# Patient Record
(demographics unavailable — no encounter records)

---

## 2024-12-06 NOTE — REVIEW OF SYSTEMS
[Constipation] : constipation [Diarrhea: Grade 0] : Diarrhea: Grade 0 [Negative] : Allergic/Immunologic [FreeTextEntry2] : UTD with mammogram, las colonoscopy 4-5 yrs ago

## 2024-12-06 NOTE — HISTORY OF PRESENT ILLNESS
[Disease: _____________________] : Disease: [unfilled] [T: ___] : T[unfilled] [N: ___] : N[unfilled] [AJCC Stage: ____] : AJCC Stage: [unfilled] [de-identified] : Patient is a 68-year-old woman who presented to her GYN, DrArturo Bland for PMB X 1 month. On 7/14/2023, endometrial biopsy showed endometrial adenocarcinoma, endometroid type, FIGO grade 1. She was referred to Dr. Loo for further evaluation on 8/15/2023.   On 8/27/2023, CT A/P showed endometrial thickening up to 1.7 cm correlates with patient's history of endometrial cancer. No definite evidence of metastatic disease in the abdomen and pelvis.  On 10/23/2023, she underwent laparoscopic total hysterectomy and bilateral salpingo-oophorectomy with b/l sentinel lymph node biopsy. On amended pathology report: she had well differentiated endometrioid carcinoma, Grade 1, 43% of myometrial invasion with extensive LVSI. There were 2/6 positive left sentinel lymph nodes, largest cluster measured 0.86 mm. Five right pelvic nodes were negative. There was no cervical stromal involvement, no uterine serosa involvement, no malignancy seen on ovaries and fallopian tubes. The peritoneal washing was negative for malignant cells. She has recovered well from her procedure well.   A few days ago she started having some vaginal  discharge. She is here to discuss  adjuvant treatment. [de-identified] : endometrioid ca [de-identified] : 12/18/23 Pt is here for follow up accompanied by his son. She reports feeling well. She reports numbness on sole of her feet. She had it even prior to starting chemo but got a little worse after a few days of chemo and it is back to normal.  We discussed dropping the chemo dose but she wants to c/w same dose for now. Son was concerned by a lot of anxiety that pt has and she probably wakes up with panic attack.   1/9/24 Pt is here for follow up. started taking gabapentin with some relief however stopped it after 4 doses. No N, V, D  1/30/24 Pt is here for follow up.  [id number] Radha is on a video call.  Pt states she does have numbness and tingling in the feet and her rt forearm usually during the 3,4,5,6 after chemotherapy. She states gabapentin helps with the pain.  She does complain of constipation which she self-treats with pumpkins and apples. She gets mild discomfort in the rt upper quadrant after treatment but that resolves on its own. Denies any nausea, vomiting, diarrhea. Otherwise, she denies any other complains.  2/20/24 Pt is here for follow up. Feels well. Tolerating chemo well. No pain  3/12/24 Pt is here for follow up. Tolerating chemo well except worsening neuropathy  6/20/24 Pt is here today for follow up visit.   #[id number] Vladslav present. Pt still c/o paresthesias.  Seen by Neuro- increased Gapapentin dose to TID from BID. Chemo and Radiation completed. 9/3/24 Continues to have neuropathy. Is on Gabapentin per neuro No abd pain, N, V, vag bleeding Has pain in the coccyx since RT completed 12/06/24:   # [id number] Owen (Citizen of [place]). Patient presented here today for a follow up post treatment for endometrial adenocarcinoma. Continues to have neuropathy. She is taking Gabapentin only once daily, despite the Neuro recommendation 3x daily.   No abdominal pain, N, V, vaginal bleeding. She is UTD with Colonoscopy from Oct 2024.  Has pain in the coccyx since RT completed

## 2024-12-06 NOTE — REASON FOR VISIT
[Follow-Up Visit] : a follow-up [Family Member] : family member [FreeTextEntry2] : uterine Ca [Interpreters_IDNumber] : [id number] [Interpreters_FullName] : Vlkayode [TWNoteComboBox1] : Paraguayan

## 2024-12-06 NOTE — ASSESSMENT
[FreeTextEntry1] : In summary this is is a 68  year old woman with endometrial carcinoma, FIGO Stage IIIC1, grade 1. She is s/p laparoscopic total hysterectomy and bilateral salpingo-oophorectomy with b/l sentinel lymph node biopsy. p53 wild type, POLE nonmutated, KRISTIN  Pt started treatment with Carboplatin and Taxol. Q 3 weeks switched to Carbo/Taxotere d/t neuropathy Chemo completed 3/12/24. RT. Completed 5/31/24. RECOMMENDATIONS:   S/S recurrence d/w pt. GYN ONC follow up  # Peripheral neuropathy -Encouraged to continue gabapentin 300 mg tid Neuro eval. Physical therapy, 2x/ week, she reports getting better with PT. # HCM mammogram from 10/10/24 with No imaging evidence of malignancy.  S/P GI follow up with screening colonoscopy and Endoscopy done from 10/4/24 results in the charts.  Colon Polyps, Diverticulosis, Internal hemorrhoids,  RTC in 4 months

## 2025-01-16 NOTE — PHYSICAL EXAM
[Sclera] : the sclera and conjunctiva were normal [Hearing Threshold Finger Rub Not [place]] : hearing was normal [] : no respiratory distress [Respiration, Rhythm And Depth] : normal respiratory rhythm and effort [Exaggerated Use Of Accessory Muscles For Inspiration] : no accessory muscle use [Heart Rate And Rhythm] : heart rate and rhythm were normal [Abdomen Soft] : soft [Nondistended] : nondistended [Abdomen Tenderness] : non-tender [No Lesions] : no lesions were seen on the external genitalia [No Bleeding] : there was no active vaginal bleeding [Absent] : was absent [Femoral Lymph Nodes Enlarged Bilaterally] : femoral [Inguinal Lymph Nodes Enlarged Bilaterally] : inguinal [Nail Clubbing] : no clubbing  or cyanosis of the fingernails [No Focal Deficits] : no focal deficits [Motor Exam] : the motor exam was normal [Normal] : no palpable adenopathy [Ulcer ___cm] : no ulcer [Mass ___ cm] : no mass

## 2025-01-16 NOTE — LETTER CLOSING
[Consult Closing:] : Thank you for allowing me to participate in the care of this patient.  If you have any questions, please do not hesitate to contact me. [Sincerely yours,] : Sincerely yours, [FreeTextEntry3] : Amber Quintero M.D.   Electronically proofread and signed by:  Amber Quintero MD Attending, Department of Radiation Medicine [facility]

## 2025-01-16 NOTE — PHYSICAL EXAM
[Sclera] : the sclera and conjunctiva were normal [Hearing Threshold Finger Rub Not [place]] : hearing was normal [] : no respiratory distress [Respiration, Rhythm And Depth] : normal respiratory rhythm and effort [Exaggerated Use Of Accessory Muscles For Inspiration] : no accessory muscle use [Heart Rate And Rhythm] : heart rate and rhythm were normal [Abdomen Soft] : soft [Nondistended] : nondistended [Abdomen Tenderness] : non-tender [No Lesions] : no lesions were seen on the external genitalia [No Bleeding] : there was no active vaginal bleeding [Absent] : was absent [Femoral Lymph Nodes Enlarged Bilaterally] : femoral [Inguinal Lymph Nodes Enlarged Bilaterally] : inguinal [Nail Clubbing] : no clubbing  or cyanosis of the fingernails [No Focal Deficits] : no focal deficits [Motor Exam] : the motor exam was normal [Normal] : no palpable adenopathy [Ulcer ___cm] : no ulcer [Mass ___ cm] : no mass

## 2025-01-16 NOTE — LETTER CLOSING
[Consult Closing:] : Thank you for allowing me to participate in the care of this patient.  If you have any questions, please do not hesitate to contact me. [Sincerely yours,] : Sincerely yours, [FreeTextEntry3] : Amber Quintero M.D.   Electronically proofread and signed by:  Amber Quintero MD Attending, Department of Radiation Medicine [facility]

## 2025-01-16 NOTE — REASON FOR VISIT
[Routine Follow-Up] : routine follow-up visit for [Endometrial Cancer] : endometrial cancer [Other: ______] : provided by OLAYINKA [Interpreters_IDNumber] : [id number] [Interpreters_FullName] : jasson [TWNoteComboBox1] : [place] Islander

## 2025-01-16 NOTE — LETTER CLOSING
[Consult Closing:] : Thank you for allowing me to participate in the care of this patient.  If you have any questions, please do not hesitate to contact me. [Sincerely yours,] : Sincerely yours, [FreeTextEntry3] : Amber Quintero M.D.   Electronically proofread and signed by:  Amber Quintero MD Attending, Department of Radiation Medicine [facility]

## 2025-01-16 NOTE — REVIEW OF SYSTEMS
[Recent Change In Weight] : ~T recent weight change [Dry Eyes] : dryness of the eyes [Joint Pain] : joint pain [Constipation] : constipation [Muscle Pain] : muscle pain [Gait Disturbance] : gait disturbance [Muscle Weakness] : muscle weakness [Negative] : Genitourinary [Abdominal Pain] : no abdominal pain [Vomiting] : no vomiting [Diarrhea] : no diarrhea [Urinary Frequency] : no change in urinary frequency [Nocturia] : no nocturia [Vaginal Discharge] : no vaginal discharge [Dysmenorrhea/Abn Vaginal Bleeding] : no dysmenorrhea/abnormal vaginal bleeding [FreeTextEntry3] : uses eye drops for dry eyes

## 2025-01-16 NOTE — REASON FOR VISIT
[Routine Follow-Up] : routine follow-up visit for [Endometrial Cancer] : endometrial cancer [Other: ______] : provided by OLAYINKA [Interpreters_IDNumber] : [id number] [Interpreters_FullName] : jasson [TWNoteComboBox1] : Finnish

## 2025-01-16 NOTE — REASON FOR VISIT
[Routine Follow-Up] : routine follow-up visit for [Endometrial Cancer] : endometrial cancer [Other: ______] : provided by OLAYINKA [Interpreters_IDNumber] : [id number] [Interpreters_FullName] : jasson [TWNoteComboBox1] : Moroccan

## 2025-01-16 NOTE — REASON FOR VISIT
[Routine Follow-Up] : routine follow-up visit for [Endometrial Cancer] : endometrial cancer [Other: ______] : provided by OLAYINKA [Interpreters_IDNumber] : [id number] [Interpreters_FullName] : jasson [TWNoteComboBox1] : Slovak

## 2025-01-16 NOTE — PHYSICAL EXAM
[Sclera] : the sclera and conjunctiva were normal [Hearing Threshold Finger Rub Not [place]] : hearing was normal [] : no respiratory distress [Respiration, Rhythm And Depth] : normal respiratory rhythm and effort [Exaggerated Use Of Accessory Muscles For Inspiration] : no accessory muscle use [Heart Rate And Rhythm] : heart rate and rhythm were normal [Abdomen Soft] : soft [Nondistended] : nondistended [Abdomen Tenderness] : non-tender [No Lesions] : no lesions were seen on the external genitalia [No Bleeding] : there was no active vaginal bleeding [Absent] : was absent [Femoral Lymph Nodes Enlarged Bilaterally] : femoral [Inguinal Lymph Nodes Enlarged Bilaterally] : inguinal [Nail Clubbing] : no clubbing  or cyanosis of the fingernails [No Focal Deficits] : no focal deficits [Motor Exam] : the motor exam was normal [Normal] : no palpable adenopathy [Ulcer ___cm] : no ulcer [Mass ___ cm] : no mass

## 2025-01-16 NOTE — DISEASE MANAGEMENT
[Pathological] : TNM Stage: p [IIIC] : IIIC [FreeTextEntry4] : endometrioid carcinoma, FIGO Stage IIIC1, G1 [TTNM] : 1a [NTNM] : 1mi [MTNM] : 0 [de-identified] : Pelvis

## 2025-01-16 NOTE — HISTORY OF PRESENT ILLNESS
[FreeTextEntry1] : LALA MARTIN returns to clinic in follow up visit.  As you know, the patient is a 69-year-old F with a diagnosis of endometrioid carcinoma, FIGO Stage IIIC1, G1. TNM Stage: p T 1a N 1mi M 0, Stage: IIIC. She is s/p TLH/BSO/Bilat SLN BX. She is s/p chemotherapy.  The patient was treated to the pelvis using an IMRT technique.  The patient tolerated treatments quite well. The patient had the expected side effects of /GI bother.   The patient received 5,040 cGy to the pelvis from 4/23/2024 through 5/31/2024.  I last saw her in June 2024.    In the interim, the patient has done well. She denies vaginal discharge or bleeding. She denies abdominal pain and has no  issues. She utilizes the vaginal dilator 4X/month/size small, last time was 4 days ago (not as instructed).  She has occasional constipation, mostly managed by eating pumpkin every morning. Last seen by Dr. Loo on 10/2024, noted ROSALBA on exam.   B/L Screening Mammo on 1010/2024 shows, no evidence of malignancy. She is s/p EGD and colonoscopy on 10/31/2024 with Gi, Dr. Kingston ([place]). Pathology was negative for malignancy.     Upcoming appointments:  Dr. Loo 2/4/25 hem/onc 4/21/25

## 2025-01-16 NOTE — HISTORY OF PRESENT ILLNESS
[FreeTextEntry1] : LALA MARTIN returns to clinic in follow up visit.  As you know, the patient is a 69-year-old F with a diagnosis of endometrioid carcinoma, FIGO Stage IIIC1, G1. TNM Stage: p T 1a N 1mi M 0, Stage: IIIC. She is s/p TLH/BSO/Bilat SLN BX. She is s/p chemotherapy.  The patient was treated to the pelvis using an IMRT technique.  The patient tolerated treatments quite well. The patient had the expected side effects of /GI bother.   The patient received 5,040 cGy to the pelvis from 4/23/2024 through 5/31/2024.  I last saw her in June 2024.    In the interim, the patient has done well. She denies vaginal discharge or bleeding. She denies abdominal pain and has no  issues. She utilizes the vaginal dilator 4X/month/size small, last time was 4 days ago (not as instructed).  She has occasional constipation, mostly managed by eating pumpkin every morning. Last seen by Dr. Loo on 10/2024, noted ROSALBA on exam.   B/L Screening Mammo on 1010/2024 shows, no evidence of malignancy. She is s/p EGD and colonoscopy on 10/31/2024 with Gi, Dr. Kingston ([place]). Pathology was negative for malignancy.     Upcoming appointments:  Dr. Loo 2/4/25 hem/onc 4/21/25

## 2025-01-16 NOTE — LETTER CLOSING
[Consult Closing:] : Thank you for allowing me to participate in the care of this patient.  If you have any questions, please do not hesitate to contact me. [Sincerely yours,] : Sincerely yours, [FreeTextEntry3] : Amber Quintero M.D.   Electronically proofread and signed by:  Amber Quintero MD Attending, Department of Radiation Medicine [facility]

## 2025-01-16 NOTE — HISTORY OF PRESENT ILLNESS
[FreeTextEntry1] : LALA MARTIN returns to clinic in follow up visit.  As you know, the patient is a 69-year-old F with a diagnosis of endometrioid carcinoma, FIGO Stage IIIC1, G1. TNM Stage: p T 1a N 1mi M 0, Stage: IIIC. She is s/p TLH/BSO/Bilat SLN BX. She is s/p chemotherapy.  The patient was treated to the pelvis using an IMRT technique.  The patient tolerated treatments quite well. The patient had the expected side effects of /GI bother.   The patient received 5,040 cGy to the pelvis from 4/23/2024 through 5/31/2024.  I last saw her in June 2024.    In the interim, the patient has done well. She denies vaginal discharge or bleeding. She denies abdominal pain and has no  issues. She utilizes the vaginal dilator 4X/month/size small, last time was 4 days ago (not as instructed).  She has occasional constipation, mostly managed by eating pumpkin every morning. Last seen by Dr. Loo on 10/2024, noted ROSALBA on exam.   B/L Screening Mammo on 1010/2024 shows, no evidence of malignancy. She is s/p EGD and colonoscopy on 10/31/2024 with Gi, Dr. Kingston ([place]). Pathology was negative for malignancy.     Upcoming appointments:  Dr. Loo 2/4/25 hem/onc 4/21/25

## 2025-01-16 NOTE — DISEASE MANAGEMENT
[Pathological] : TNM Stage: p [IIIC] : IIIC [FreeTextEntry4] : endometrioid carcinoma, FIGO Stage IIIC1, G1 [TTNM] : 1a [NTNM] : 1mi [MTNM] : 0 [de-identified] : Pelvis

## 2025-01-16 NOTE — VITALS
[Maximal Pain Intensity: 0/10] : 0/10 [Least Pain Intensity: 0/10] : 0/10 [100: Normal, no complaints, no evidence of disease.] : 100: Normal, no complaints, no evidence of disease. [90: Able to carry normal activity; minor signs or symptoms of disease.] : 90: Able to carry normal activity; minor signs or symptoms of disease.

## 2025-01-16 NOTE — DISEASE MANAGEMENT
[Pathological] : TNM Stage: p [IIIC] : IIIC [FreeTextEntry4] : endometrioid carcinoma, FIGO Stage IIIC1, G1 [TTNM] : 1a [NTNM] : 1mi [MTNM] : 0 [de-identified] : Pelvis

## 2025-01-16 NOTE — PHYSICAL EXAM
[Sclera] : the sclera and conjunctiva were normal [Hearing Threshold Finger Rub Not [place]] : hearing was normal [] : no respiratory distress [Respiration, Rhythm And Depth] : normal respiratory rhythm and effort [Exaggerated Use Of Accessory Muscles For Inspiration] : no accessory muscle use [Heart Rate And Rhythm] : heart rate and rhythm were normal [Abdomen Soft] : soft [Nondistended] : nondistended [Abdomen Tenderness] : non-tender [No Lesions] : no lesions were seen on the external genitalia [No Bleeding] : there was no active vaginal bleeding [Absent] : was absent [Femoral Lymph Nodes Enlarged Bilaterally] : femoral [Inguinal Lymph Nodes Enlarged Bilaterally] : inguinal [Nail Clubbing] : no clubbing  or cyanosis of the fingernails [No Focal Deficits] : no focal deficits [Motor Exam] : the motor exam was normal [Normal] : no palpable adenopathy [Ulcer ___cm] : no ulcer [Mass ___ cm] : no mass

## 2025-01-16 NOTE — DISEASE MANAGEMENT
[Pathological] : TNM Stage: p [IIIC] : IIIC [FreeTextEntry4] : endometrioid carcinoma, FIGO Stage IIIC1, G1 [TTNM] : 1a [NTNM] : 1mi [MTNM] : 0 [de-identified] : Pelvis

## 2025-01-16 NOTE — HISTORY OF PRESENT ILLNESS
[FreeTextEntry1] : LALA MARTIN returns to clinic in follow up visit.  As you know, the patient is a 69-year-old F with a diagnosis of endometrioid carcinoma, FIGO Stage IIIC1, G1. TNM Stage: p T 1a N 1mi M 0, Stage: IIIC. She is s/p TLH/BSO/Bilat SLN BX. She is s/p chemotherapy.  The patient was treated to the pelvis using an IMRT technique.  The patient tolerated treatments quite well. The patient had the expected side effects of /GI bother.   The patient received 5,040 cGy to the pelvis from 4/23/2024 through 5/31/2024.  I last saw her in June 2024.    In the interim, the patient has done well. She denies vaginal discharge or bleeding. She denies abdominal pain and has no  issues. She utilizes the vaginal dilator 4X/month/size small, last time was 4 days ago (not as instructed).  She has occasional constipation, mostly managed by eating pumpkin every morning. Last seen by Dr. Loo on 10/2024, noted ROSALBA on exam.   B/L Screening Mammo on 1010/2024 shows, no evidence of malignancy. She is s/p EGD and colonoscopy on 10/31/2024 with Gi, Dr. Kingston ([place]). Pathology was negative for malignancy.     Upcoming appointments:  Dr. Loo 2/4/25 hem/onc 4/21/25

## 2025-02-18 NOTE — PHYSICAL EXAM
[Chaperone Present] : A chaperone was present in the examining room during all aspects of the physical examination [92424] : A chaperone was present during the pelvic exam. [FreeTextEntry2] : Britany Elmore [TextEntry] : Well - developed, well-nourished female in no acute distress HEENT - wnl Breasts - symmetrical w/o masses or nipple discharge Abdomen - soft, non-tender, +BS Incisions - well healed Back - no CVA tenderness or spinal tenderness Extremities - w/o clubbing, cyanosis, no lesions noted Neuro - AAOx3  Pelvic Exam External Genitalia - with out lesions Vagina - mucosa atrophic, cuff intact and well supported. Cervix - surgically absent Uterus - surgically absent Adnexa - surgically absent Rectovaginal - confirmatory

## 2025-02-18 NOTE — HISTORY OF PRESENT ILLNESS
[FreeTextEntry1] : 69 year old female, of Dr. Bland referred to this office for evaluation of endometrial adenocarcinoma. Pt is P1-0-0-1, LMP at age 55. She is s/p hysteroscopy, EMB and her pathology revealed endometrial adenocarcinoma endomeriod type, FIGO grade 1. Patient is s/p TLH, BSO, ROSEANNE, LNS on 10/23/23. Stage IIIC ECA. She has completed chemo and radiation. (5/24)  Last seen in GYN/ONC on 10/01/24, patient doing well with no clinical evidence of disease.  Patient presents today for follow up.

## 2025-02-18 NOTE — PHYSICAL EXAM
[Chaperone Present] : A chaperone was present in the examining room during all aspects of the physical examination [12966] : A chaperone was present during the pelvic exam. [FreeTextEntry2] : Britany Elmore [TextEntry] : Well - developed, well-nourished female in no acute distress HEENT - wnl Breasts - symmetrical w/o masses or nipple discharge Abdomen - soft, non-tender, +BS Incisions - well healed Back - no CVA tenderness or spinal tenderness Extremities - w/o clubbing, cyanosis, no lesions noted Neuro - AAOx3  Pelvic Exam External Genitalia - with out lesions Vagina - mucosa atrophic, cuff intact and well supported. Cervix - surgically absent Uterus - surgically absent Adnexa - surgically absent Rectovaginal - confirmatory

## 2025-02-18 NOTE — ASSESSMENT
[FreeTextEntry1] : 69 year old female, of Dr. Bland referred to this office for evaluation of endometrial adenocarcinoma. Pt is P1-0-0-1, LMP at age 55. She is s/p hysteroscopy, EMB and her pathology revealed endometrial adenocarcinoma endomeriod type, FIGO grade 1. Patient is s/p TLH, BSO, ROSEANNE, LNS on 10/23/23. Stage IIIC ECA. She has completed chemo and radiation. (5/24)

## 2025-04-22 NOTE — HISTORY OF PRESENT ILLNESS
[Disease: _____________________] : Disease: [unfilled] [T: ___] : T[unfilled] [N: ___] : N[unfilled] [AJCC Stage: ____] : AJCC Stage: [unfilled] [de-identified] : Patient is a 68-year-old woman who presented to her GYN, DrArturo Bland for PMB X 1 month. On 7/14/2023, endometrial biopsy showed endometrial adenocarcinoma, endometroid type, FIGO grade 1. She was referred to Dr. Loo for further evaluation on 8/15/2023.   On 8/27/2023, CT A/P showed endometrial thickening up to 1.7 cm correlates with patient's history of endometrial cancer. No definite evidence of metastatic disease in the abdomen and pelvis.  On 10/23/2023, she underwent laparoscopic total hysterectomy and bilateral salpingo-oophorectomy with b/l sentinel lymph node biopsy. On amended pathology report: she had well differentiated endometrioid carcinoma, Grade 1, 43% of myometrial invasion with extensive LVSI. There were 2/6 positive left sentinel lymph nodes, largest cluster measured 0.86 mm. Five right pelvic nodes were negative. There was no cervical stromal involvement, no uterine serosa involvement, no malignancy seen on ovaries and fallopian tubes. The peritoneal washing was negative for malignant cells. She has recovered well from her procedure well.   A few days ago she started having some vaginal  discharge. She is here to discuss  adjuvant treatment. [de-identified] : endometrioid ca [de-identified] : 12/18/23 Pt is here for follow up accompanied by his son. She reports feeling well. She reports numbness on sole of her feet. She had it even prior to starting chemo but got a little worse after a few days of chemo and it is back to normal.  We discussed dropping the chemo dose but she wants to c/w same dose for now. Son was concerned by a lot of anxiety that pt has and she probably wakes up with panic attack.   1/9/24 Pt is here for follow up. started taking gabapentin with some relief however stopped it after 4 doses. No N, V, D  1/30/24 Pt is here for follow up.  [id number] Radha is on a video call.  Pt states she does have numbness and tingling in the feet and her rt forearm usually during the 3,4,5,6 after chemotherapy. She states gabapentin helps with the pain.  She does complain of constipation which she self-treats with pumpkins and apples. She gets mild discomfort in the rt upper quadrant after treatment but that resolves on its own. Denies any nausea, vomiting, diarrhea. Otherwise, she denies any other complains.  2/20/24 Pt is here for follow up. Feels well. Tolerating chemo well. No pain  3/12/24 Pt is here for follow up. Tolerating chemo well except worsening neuropathy  6/20/24 Pt is here today for follow up visit.   #[id number] Vladslav present. Pt still c/o paresthesias.  Seen by Neuro- increased Gapapentin dose to TID from BID. Chemo and Radiation completed. 9/3/24 Continues to have neuropathy. Is on Gabapentin per neuro No abd pain, N, V, vag bleeding Has pain in the coccyx since RT completed 12/06/24:   # [id number] Owen ([place]). Patient presented here today for a follow up post treatment for endometrial adenocarcinoma. Continues to have neuropathy. She is taking Gabapentin only once daily, despite the Neuro recommendation 3x daily.   No abdominal pain, N, V, vaginal bleeding. She is UTD with Colonoscopy from Oct 2024.  Has pain in the coccyx since RT completed 4/22/25 Pt is here for follow up. Has abd pain when she has a BM which resolves after BM Has Vaginal clear discharge. Had Colonoscopy in 2024. She had polyps, advised to rpt colonoscopy in 2 yrs. C/O neuropathy

## 2025-04-22 NOTE — REASON FOR VISIT
[Follow-Up Visit] : a follow-up [Family Member] : family member [FreeTextEntry2] : uterine Ca [Interpreters_IDNumber] : [id number] [Interpreters_FullName] : Vlkayode [TWNoteComboBox1] : Icelandic

## 2025-04-22 NOTE — ASSESSMENT
[FreeTextEntry1] : In summary this is is a 69  year old woman with endometrial carcinoma, FIGO Stage IIIC1, grade 1. She is s/p laparoscopic total hysterectomy and bilateral salpingo-oophorectomy with b/l sentinel lymph node biopsy. p53 wild type, POLE nonmutated, KRISTIN  Pt started treatment with Carboplatin and Taxol. Q 3 weeks switched to Carbo/Taxotere d/t neuropathy Chemo completed 3/12/24. RT. Completed 5/31/24. RECOMMENDATIONS:   S/S recurrence d/w pt. GYN ONC follow up  # Peripheral neuropathy -Encouraged to continue gabapentin 300 mg tid Neuro follow up  # HCM mammogram from 10/10/24 with No imaging evidence of malignancy.  S/P GI follow up with screening colonoscopy and Endoscopy done from 10/4/24 results in the charts.  Colon Polyps, Diverticulosis, Internal hemorrhoids,  RTC in 6 months

## 2025-07-16 NOTE — HISTORY OF PRESENT ILLNESS
[FreeTextEntry1] : LALA MARTIN returns to clinic in follow up visit.  As you know, the patient is a 70-year-old F with a diagnosis of endometrioid carcinoma, FIGO Stage IIIC1, G1. TNM Stage: p T 1a N 1mi M 0, Stage: IIIC. She is s/p TLH/BSO/Bilat SLN BX. She is s/p chemotherapy.  The patient was treated to the pelvis using an IMRT technique.  The patient tolerated treatments quite well. The patient had the expected side effects of /GI bother.   The patient received 5,040 cGy to the pelvis from 4/23/2024 through 5/31/2024.  I last saw her in January 2025.    In the interim, the patient reports abdominal discomfort before bowel movements. She is drinking flaxseed to help with constipation. She was prescribed a stool softener but has not tried it yet. She feels the flaxseed has been sufficient. She has a bowel movement every day or every other day. She denies vaginal bleeding or discharge. Denies  issues. She continues to follow up with Dr. Loo and Dr. Melendez. She has no other issues at this time.    Upcoming appointments:  Dr. Loo 9/12/25 Heme/onc 10/9/25

## 2025-07-16 NOTE — PHYSICAL EXAM
[Sclera] : the sclera and conjunctiva were normal [] : no respiratory distress [Exaggerated Use Of Accessory Muscles For Inspiration] : no accessory muscle use [Nondistended] : nondistended [Normal External Genitalia] : normal external genitalia  [Normal Vaginal Cuff] : vaginal cuff without lesion or nodularity [Normal] : oriented to person, place and time, the affect was normal, the mood was normal and not anxious [de-identified] : No visible or palpable lesions in the vagina.  No blood on exam.

## 2025-07-16 NOTE — LETTER CLOSING
[Consult Closing:] : Thank you for allowing me to participate in the care of this patient.  If you have any questions, please do not hesitate to contact me. [Sincerely yours,] : Sincerely yours, [FreeTextEntry3] : Amber Quintero M.D.   Electronically proofread and signed by:  Amber Quintero MD Attending, Department of Radiation Medicine [facility]

## 2025-07-16 NOTE — DISEASE MANAGEMENT
[Pathological] : TNM Stage: p [IIIC] : IIIC [FreeTextEntry4] : endometrioid carcinoma, FIGO Stage IIIC1, G1 [TTNM] : 1a [NTNM] : 1mi [MTNM] : 0 [de-identified] : Pelvis 0

## 2025-07-16 NOTE — LETTER CLOSING
[Consult Closing:] : Thank you for allowing me to participate in the care of this patient.  If you have any questions, please do not hesitate to contact me. [Sincerely yours,] : Sincerely yours, [FreeTextEntry3] : Amber Quintero M.D.   Electronically proofread and signed by:  Amber Quintero MD Attending, Department of Radiation Medicine [facility]

## 2025-07-16 NOTE — REASON FOR VISIT
[Routine Follow-Up] : routine follow-up visit for [Endometrial Cancer] : endometrial cancer [Language Line ] : provided by Language [employer]   [Interpreters_IDNumber] : [id number] [Interpreters_FullName] : Lennie [TWNoteComboBox1] : [place]

## 2025-07-16 NOTE — DISEASE MANAGEMENT
[Pathological] : TNM Stage: p [IIIC] : IIIC [FreeTextEntry4] : endometrioid carcinoma, FIGO Stage IIIC1, G1 [TTNM] : 1a [NTNM] : 1mi [MTNM] : 0 [de-identified] : Pelvis

## 2025-07-16 NOTE — PHYSICAL EXAM
[Sclera] : the sclera and conjunctiva were normal [] : no respiratory distress [Exaggerated Use Of Accessory Muscles For Inspiration] : no accessory muscle use [Nondistended] : nondistended [Normal External Genitalia] : normal external genitalia  [Normal Vaginal Cuff] : vaginal cuff without lesion or nodularity [Normal] : oriented to person, place and time, the affect was normal, the mood was normal and not anxious [de-identified] : No visible or palpable lesions in the vagina.  No blood on exam.

## 2025-07-16 NOTE — REVIEW OF SYSTEMS
[Dry Eyes] : dryness of the eyes [Constipation] : constipation [Muscle Weakness] : muscle weakness [Anxiety] : anxiety [Easy Bruising] : a tendency for easy bruising [Negative] : Allergic/Immunologic [Urinary Frequency] : no change in urinary frequency [Vaginal Discharge] : no vaginal discharge [Dysmenorrhea/Abn Vaginal Bleeding] : no dysmenorrhea/abnormal vaginal bleeding [Suicidal] : not suicidal [Depression] : no depression [FreeTextEntry7] : taking flaxseed [de-identified] : neuropathy

## 2025-07-16 NOTE — REVIEW OF SYSTEMS
[Dry Eyes] : dryness of the eyes [Constipation] : constipation [Muscle Weakness] : muscle weakness [Anxiety] : anxiety [Easy Bruising] : a tendency for easy bruising [Negative] : Allergic/Immunologic [Urinary Frequency] : no change in urinary frequency [Vaginal Discharge] : no vaginal discharge [Dysmenorrhea/Abn Vaginal Bleeding] : no dysmenorrhea/abnormal vaginal bleeding [Suicidal] : not suicidal [Depression] : no depression [FreeTextEntry7] : taking flaxseed [de-identified] : neuropathy

## 2025-07-16 NOTE — REASON FOR VISIT
[Routine Follow-Up] : routine follow-up visit for [Endometrial Cancer] : endometrial cancer [Language Line ] : provided by [employer]   [Interpreters_IDNumber] : [id number] [Interpreters_FullName] : Lennie [TWNoteComboBox1] : Faroese